# Patient Record
Sex: FEMALE | Race: BLACK OR AFRICAN AMERICAN
[De-identification: names, ages, dates, MRNs, and addresses within clinical notes are randomized per-mention and may not be internally consistent; named-entity substitution may affect disease eponyms.]

---

## 2019-11-02 ENCOUNTER — HOSPITAL ENCOUNTER (EMERGENCY)
Dept: HOSPITAL 61 - ER | Age: 79
Discharge: HOME | End: 2019-11-02
Payer: MEDICARE

## 2019-11-02 VITALS
DIASTOLIC BLOOD PRESSURE: 95 MMHG | SYSTOLIC BLOOD PRESSURE: 191 MMHG | DIASTOLIC BLOOD PRESSURE: 95 MMHG | SYSTOLIC BLOOD PRESSURE: 191 MMHG

## 2019-11-02 VITALS — WEIGHT: 225 LBS | BODY MASS INDEX: 37.49 KG/M2 | HEIGHT: 65 IN

## 2019-11-02 DIAGNOSIS — S52.502A: Primary | ICD-10-CM
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
## 2019-11-02 DIAGNOSIS — I10: ICD-10-CM

## 2019-11-02 DIAGNOSIS — S52.602A: ICD-10-CM

## 2019-11-02 DIAGNOSIS — E11.9: ICD-10-CM
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
## 2019-11-02 PROCEDURE — 73110 X-RAY EXAM OF WRIST: CPT

## 2019-11-02 PROCEDURE — 29125 APPL SHORT ARM SPLINT STATIC: CPT

## 2019-11-02 NOTE — RAD
WRIST 3V LEFT 

 

DATE: 11/2/2019 1:59 PM

 

INDICATION:  Wrist pain after falling  

 

COMPARISON:  None.

 

FINDINGS: 

 

Bones: Acute nondisplaced distal radius fracture without convincing 

intra-articular extension. Acute nondisplaced fracture across the base of 

the ulnar styloid. 

 

Joints: Mild degenerative changes of the first CMC joint. Moderate 

degenerative changes of the first MCP joint. Mild degenerative changes of 

the IP joints.  

 

Miscellaneous: Soft tissue swelling about the wrist.

 

IMPRESSION:  

 

Acute nondisplaced distal radius and ulnar styloid fractures.

 

Electronically signed by: Paolo Rodriguez MD (11/2/2019 2:23 PM) [facility] [place]

## 2019-11-02 NOTE — PHYS DOC
Past Medical History


Past Medical History:  Diabetes-Type II, Hypertension


Past Surgical History:  Hysterectomy


Alcohol Use:  None


Drug Use:  None





Adult General


Chief Complaint


Chief Complaint:  HAND PROBLEM





HPI


HPI





Patient is a 79  year old [female] who presents with [left wrist pain. Patient 

reports she been walking at home 2 days ago, when she had tripped over one of 

her grandchildren, falling on her left wrist. States she thought it would get 

better, so she just waited. States she has noticed her anterior some increased 

swelling, she is unable to flex or extend her hand. Denies any discomfort 

shoulder, elbow. Denies any additional discomfort. Denies any dizziness prior to

 falling, denies any discomfort prior to falling, reports she does have an 

unsteady gait typically, the daughter reports patient refuses to use a cane or a

 walker.]





Review of Systems


Review of Systems





Constitutional: Denies fever or chills []


Eyes: Denies change in visual acuity, redness, or eye pain []





Respiratory: Denies cough or shortness of breath []


Cardiovascular: No additional information not addressed in HPI []


GI: Denies abdominal pain, nausea, vomiting, bloody stools or diarrhea []





Musculoskeletal: Denies back pain or joint pain other than to left wrist. States

 pain and swelling to left hand []


Integument: Denies rash or skin lesions []


Neurologic: Denies headache, focal weakness or sensory changes []


 []





All other systems were reviewed and found to be within normal limits, except as 

documented in this note.





Allergies


Allergies





Allergies








Coded Allergies Type Severity Reaction Last Updated Verified


 


  No Known Drug Allergies    11/2/19 No











Physical Exam


Physical Exam





Constitutional: Well developed, well nourished, no acute distress, non-toxic 

appearance. []





Neck: Normal range of motion, no tenderness, supple, no stridor. [] 


Cardiovascular:Heart rate regular rhythm, no murmur []


Lungs & Thorax:  Bilateral breath sounds clear to auscultation []


Abdomen: Bowel sounds normal, soft, no tenderness, no masses, no pulsatile 

masses. [] 


Skin: Warm, dry, no erythema, no rash. [] 


Back: No tenderness, no CVA tenderness. [] 


Extremities: No tenderness, no cyanosis, no clubbing, Swelling noted to left 

wrist, tenderness, slight deformity noted. 


Sensation intact to all digits, capillary refill brisk, digits warm. [] 


Neurologic: Alert and oriented X 3, normal motor function, normal sensory functi

on, no focal deficits noted. []


Psychologic: Affect normal, judgement normal, mood normal. []





Current Patient Data


Vital Signs





                                   Vital Signs








  Date Time  Temp Pulse Resp B/P (MAP) Pulse Ox O2 Delivery O2 Flow Rate FiO2


 


11/2/19 13:35 98.7 100 16 191/95 (127) 95 Room Air  





 98.7       











EKG


EKG


[]





Radiology/Procedures


Radiology/Procedures


Bones: Acute nondisplaced distal radius fracture without convincing 


intra-articular extension. Acute nondisplaced fracture across the base of 


the ulnar styloid. 


 


Joints: Mild degenerative changes of the first CMC joint. Moderate 


degenerative changes of the first MCP joint. Mild degenerative changes of 


the IP joints.  


 


Miscellaneous: Soft tissue swelling about the wrist.


 


IMPRESSION:  


 


Acute nondisplaced distal radius and ulnar styloid fractures.


 


Electronically signed by: Paolo Rodriguez MD (11/2/2019 2:23 PM) [facility]





[]





Course & Med Decision Making


Course & Med Decision Making


Pertinent Labs and Imaging studies reviewed. (See chart for details)





[Discussed fracture with patient


Arm splinted


Circulation, sensation, motion of distal digits noted following splint 

application


Follow up with Dr Mireles, Orthopedics. 


Patient without further questions or concerns. ]





Dragon Disclaimer


Dragon Disclaimer


This electronic medical record was generated, in whole or in part, using a voice

recognition dictation system.





Departure


Departure


Impression:  


   Primary Impression:  


   Fracture of distal end of left radius and ulna


Disposition:  01 HOME, SELF-CARE


Condition:  GOOD


Referrals:  


AGUSTÍN HUDSON MD (PCP)








MACEY MIRELES MD


Patient Instructions:  Elbow Fracture, Simple





Additional Instructions:  


As discussed, you should take tylenol or ibuprofen for discomfort. Wear the 

splint.  Keep the splint dry. Call Orthopedics on Monday for an appointment to 

follow up with them, to further evaluate and manage your fracture.


Scripts


Acetaminophen (ACETAMINOPHEN) 500 Mg Tablet


500 MG PO Q4-6HRS PRN for MODERATE PAIN 4-6, #180 TAB


   Prov: CHET ALONSO MANDO MALONEY         11/2/19





Problem Qualifiers








   Primary Impression:  


   Fracture of distal end of left radius and ulna


   Encounter type:  initial encounter  Fracture type:  closed  Qualified Codes: 

   S52.502A - Unspecified fracture of the lower end of left radius, initial 

   encounter for closed fracture; S52.602A - Unspecified fracture of lower end 

   of left ulna, initial encounter for closed fracture








CHET ALONSO               Nov 2, 2019 14:17

## 2022-04-12 ENCOUNTER — HOSPITAL ENCOUNTER (OUTPATIENT)
Dept: HOSPITAL 61 - KCIC | Age: 82
End: 2022-04-12
Attending: PHYSICAL MEDICINE & REHABILITATION
Payer: COMMERCIAL

## 2022-04-12 DIAGNOSIS — M48.061: ICD-10-CM

## 2022-04-12 DIAGNOSIS — M46.1: ICD-10-CM

## 2022-04-12 DIAGNOSIS — M25.751: ICD-10-CM

## 2022-04-12 DIAGNOSIS — M16.11: ICD-10-CM

## 2022-04-12 DIAGNOSIS — I86.2: ICD-10-CM

## 2022-04-12 DIAGNOSIS — M25.851: ICD-10-CM

## 2022-04-12 DIAGNOSIS — M43.8X6: ICD-10-CM

## 2022-04-12 DIAGNOSIS — M47.816: Primary | ICD-10-CM

## 2022-04-12 DIAGNOSIS — Z98.890: ICD-10-CM

## 2022-04-12 DIAGNOSIS — M47.896: ICD-10-CM

## 2022-04-12 PROCEDURE — 72110 X-RAY EXAM L-2 SPINE 4/>VWS: CPT

## 2022-04-12 PROCEDURE — 73501 X-RAY EXAM HIP UNI 1 VIEW: CPT

## 2022-04-13 NOTE — [facility]
Exam: XR HIP (WITH OR WITHOUT PELVIS) RIGHT 1 VIEW



History: Chronic right hip pain



Comparison: None.



Findings:

Decreased osseous mineralization. No fracture or dislocation. Severe right hip degenerative changes w
ith complete loss of the joint space and prominent femoral head and acetabular subchondral cystic clarita
nge and sclerosis. Large collar osteophytes. Moderate joint space narrowing of the left hip. Degenera
tive changes of the right greater than left sacroiliac joints. The pubic rami are intact. Multiple pe
lvic phleboliths.



Impression: 

1.  Severe degenerative changes of the right hip with complete joint space loss.



Electronically signed by: Gab Plaza MD (4/13/2022 9:00 AM) [id number]

## 2022-04-13 NOTE — KC[facility]
XR LUMBAR SPINE 4+V



History: Back pain, degenerative disc disease.

Comparison: None.

Technique: 5 views of the lumbar spine.



Findings:

There are 5 non-rib bearing lumbar vertebral segments.

There is no evidence of fracture. No destructive osseous lesions.

Mild dextroconvex curvature of the lumbar spine.

Multilevel facet hypertrophic degenerative changes. Degenerative changes of the abutting lumbar spino
us processes.

Pan lumbar moderate disc space narrowing with endplate osteophytes.

Degenerative changes of the right greater than left sacroiliac joints.

Surgical clips in the left upper abdomen.



IMPRESSION:

1.  Moderate multilevel lumbar spondylosis.



Electronically signed by: Gab Plaza MD (4/13/2022 9:03 AM) [id number]